# Patient Record
(demographics unavailable — no encounter records)

---

## 2025-06-06 NOTE — PHYSICAL EXAM
[Chaperoned Physical Exam] : A chaperone was present in the examining room during all aspects of the physical examination. [Appropriately responsive] : appropriately responsive [Alert] : alert [No Acute Distress] : no acute distress [No Lymphadenopathy] : no lymphadenopathy [Soft] : soft [Non-tender] : non-tender [Non-distended] : non-distended [No HSM] : No HSM [No Lesions] : no lesions [No Mass] : no mass [Oriented x3] : oriented x3 [Labia Majora] : normal [Labia Minora] : normal [Normal] : normal [Uterine Adnexae] : normal [Tender] : tender [FreeTextEntry6] : Right chest wall TTP at 6-9 o'clock, cystic right breast [Tenderness Of The Right Breast] : tenderness [The Left Breast Was Examined] : a normal appearance [Breast Mass Left Breast ___cm] : no mass was palpable [IUD String] : an IUD string was noted

## 2025-06-06 NOTE — PLAN
[FreeTextEntry1] : - Referred to breast specialist for at least 15yr history of right breast/chest wall pain - Referred to PCP - Rx Mammo/Sono - Pap

## 2025-06-06 NOTE — HISTORY OF PRESENT ILLNESS
[FreeTextEntry1] : 41 year F presents for annual visit. c/o right breast pain @ 3 o'clock hx right breast biopsy in  and , benign   LMP: 2025 Menses: 11yo/q28d/5d SA/Contraception: Yes, men, Mirena IUD ()   OBHx: ,  x 2 GYNHx: Denies PMHx: Denies PSHx: Breast bx x 2 FamHx:  Liver Ca (Father, 40s) Meds: None Allergies: Advil/PCN (facial rash) Soc Hx: DEnies   Health Maintenance: Last Pap - 2023 Mammo -